# Patient Record
Sex: FEMALE | Race: WHITE | NOT HISPANIC OR LATINO | URBAN - METROPOLITAN AREA
[De-identification: names, ages, dates, MRNs, and addresses within clinical notes are randomized per-mention and may not be internally consistent; named-entity substitution may affect disease eponyms.]

---

## 2021-08-13 ENCOUNTER — EMERGENCY (EMERGENCY)
Facility: HOSPITAL | Age: 26
LOS: 1 days | Discharge: HOME | End: 2021-08-13
Attending: STUDENT IN AN ORGANIZED HEALTH CARE EDUCATION/TRAINING PROGRAM
Payer: COMMERCIAL

## 2021-08-13 VITALS
WEIGHT: 139.99 LBS | HEIGHT: 64 IN | TEMPERATURE: 98 F | SYSTOLIC BLOOD PRESSURE: 111 MMHG | DIASTOLIC BLOOD PRESSURE: 75 MMHG | HEART RATE: 77 BPM | OXYGEN SATURATION: 97 % | RESPIRATION RATE: 18 BRPM

## 2021-08-13 LAB
APPEARANCE UR: ABNORMAL
BACTERIA # UR AUTO: ABNORMAL
BILIRUB UR-MCNC: NEGATIVE — SIGNIFICANT CHANGE UP
COLOR SPEC: YELLOW — SIGNIFICANT CHANGE UP
COMMENT - URINE: SIGNIFICANT CHANGE UP
DIFF PNL FLD: ABNORMAL
EPI CELLS # UR: SIGNIFICANT CHANGE UP
GLUCOSE UR QL: NEGATIVE — SIGNIFICANT CHANGE UP
HCG UR QL: NEGATIVE — SIGNIFICANT CHANGE UP
KETONES UR-MCNC: ABNORMAL
LEUKOCYTE ESTERASE UR-ACNC: ABNORMAL
NITRITE UR-MCNC: POSITIVE
PH UR: 6 — SIGNIFICANT CHANGE UP (ref 5–8)
PROT UR-MCNC: 500 MG/DL
RBC CASTS # UR COMP ASSIST: ABNORMAL /HPF (ref 0–4)
SP GR SPEC: 1.02 — SIGNIFICANT CHANGE UP (ref 1.01–1.02)
UROBILINOGEN FLD QL: NEGATIVE — SIGNIFICANT CHANGE UP
WBC UR QL: >50

## 2021-08-13 PROCEDURE — 87186 SC STD MICRODIL/AGAR DIL: CPT

## 2021-08-13 PROCEDURE — 81025 URINE PREGNANCY TEST: CPT

## 2021-08-13 PROCEDURE — 87077 CULTURE AEROBIC IDENTIFY: CPT

## 2021-08-13 PROCEDURE — 99283 EMERGENCY DEPT VISIT LOW MDM: CPT

## 2021-08-13 PROCEDURE — 87086 URINE CULTURE/COLONY COUNT: CPT

## 2021-08-13 PROCEDURE — 99284 EMERGENCY DEPT VISIT MOD MDM: CPT

## 2021-08-13 PROCEDURE — 81001 URINALYSIS AUTO W/SCOPE: CPT

## 2021-08-13 RX ORDER — CEFPODOXIME PROXETIL 100 MG
1 TABLET ORAL
Qty: 20 | Refills: 0
Start: 2021-08-13 | End: 2021-08-22

## 2021-08-13 RX ORDER — ACETAMINOPHEN 500 MG
650 TABLET ORAL ONCE
Refills: 0 | Status: COMPLETED | OUTPATIENT
Start: 2021-08-13 | End: 2021-08-13

## 2021-08-13 RX ORDER — CEFPODOXIME PROXETIL 100 MG
200 TABLET ORAL ONCE
Refills: 0 | Status: COMPLETED | OUTPATIENT
Start: 2021-08-13 | End: 2021-08-13

## 2021-08-13 RX ORDER — PHENAZOPYRIDINE HCL 100 MG
200 TABLET ORAL ONCE
Refills: 0 | Status: COMPLETED | OUTPATIENT
Start: 2021-08-13 | End: 2021-08-13

## 2021-08-13 RX ORDER — ONDANSETRON 8 MG/1
1 TABLET, FILM COATED ORAL
Qty: 6 | Refills: 0
Start: 2021-08-13 | End: 2021-08-14

## 2021-08-13 RX ORDER — PHENAZOPYRIDINE HCL 100 MG
1 TABLET ORAL
Qty: 5 | Refills: 0
Start: 2021-08-13 | End: 2021-08-14

## 2021-08-13 RX ADMIN — Medication 200 MILLIGRAM(S): at 06:39

## 2021-08-13 RX ADMIN — Medication 650 MILLIGRAM(S): at 06:02

## 2021-08-13 NOTE — ED PROVIDER NOTE - PHYSICAL EXAMINATION
Vital signs noted, see flowsheet.  General: NAD, well appearing and non-toxic.  HEENT: NC/AT. MMM. Conjunctiva and sclera clear b/l.  EOMI. PERRL.  Neck: Soft and supple, full ROM without pain.  Cardiac: RRR. +S1/S2. Peripheral pulses 2+ and symmetric b/l. .  Respiratory: Speaking in full sentences, no evidence of respiratory distress. Lungs CTA b/l, no wheezes/rhonchi/rales/stridor.   Abdomen: Normoactive bowel sounds x 4 quadrants. Soft, NTND. No guarding or rebound tenderness. + Suprapubic tenderness.  Back: Spine midline and non-tender. No CVAT.  Skin: Normal color for race, no evidence of rash, ecchymosis, cyanosis or jaundice.   Neuro: Awake, alert and oriented to person/place/time/situation. Moves all extremities spontaneously and symmetrically.  No focal deficits  Psych: Normal affect

## 2021-08-13 NOTE — ED PROVIDER NOTE - ATTENDING CONTRIBUTION TO CARE
26yo female with no pmh presents with left lower back pain, suprapubic pain and dysuria for 2 days. Pt also with one episode of nbnb emesis. Pt also with subjective fevers. Pt denies ha, loc, focal neuro deficits, cp/sob/palp, cough, abd pain/d, recent travel and sick contacts.  +uti  +CVAT  well appearing clinically pyelo, abx, follow up

## 2021-08-13 NOTE — ED PROVIDER NOTE - OBJECTIVE STATEMENT
24 y/o F with no PMHx presents to ED c/o left lower back pain, suprapubic pain and dysuria since yesterday. Pt reports tonight was severe at home and she had 1 episode of nbnb vomiting. Pt felt hot and sweaty but no measured fever. No analgesic or antipyretic at home. Denies current nausea, diarrhea, vaginal bleeding or discharge, hematuria.     LMP: 8/4/21 24 y/o F with no PMHx presents to ED c/o left lower back pain, suprapubic pain and dysuria since yesterday. Pt reports tonight was severe at home and she had 1 episode of nbnb vomiting. Pt felt hot and sweaty but no measured fever. No analgesic or antipyretic at home. Improved after drinking cranberry juice. Denies current nausea, diarrhea, vaginal bleeding or discharge, hematuria.     LMP: 8/4/21

## 2021-08-13 NOTE — ED PROVIDER NOTE - PROGRESS NOTE DETAILS
MICHAEL Antoine NOTE: Will tx for pyelonephritis due to back discomfort; abx and strict return precautions  Pt stable for d/c, reports improvement, VSS, tolerating PO, ambulatory.  Discussion includes results, plan, proper medication use/side effects, and return precautions. Pt advised to f/u with PMD 1-2 days.  Printed copies of available lab/radiology results contained within discharge packet. Pt verbalized understanding/agreement of plan.

## 2021-08-13 NOTE — ED PROVIDER NOTE - CLINICAL SUMMARY MEDICAL DECISION MAKING FREE TEXT BOX
24 y/o F with no PMHx presents to ED c/o left lower back pain, suprapubic pain and dysuria since yesterday. Pt reports tonight was severe at home and she had 1 episode of nbnb vomiting. Pt felt hot and sweaty but no measured fever. No analgesic or antipyretic at home  -Will give tylenol, check urine and reassess

## 2021-08-13 NOTE — ED PROVIDER NOTE - NSFOLLOWUPINSTRUCTIONS_ED_ALL_ED_FT
- Please follow up with your Primary Care Doctor in 1 - 2 days. If you cannot follow-up with your primary care doctor please return to the Emergency Department for any urgent issues.  - Seek immediate medical care for any new, worsening or concerning signs or symptoms.   - Take medications as directed, be sure to read all instructions on packaging, Be sure to complete entire course of antibiotics as directed   - You were given copies of all your test results, please bring to your primary care doctor for review of any abnormal test results  - If you have difficulty following up, please call: [phone] or go to [email or web address] to obtain a [facility] doctor or specialist who takes your insurance in your area.    Feel better!      Pyelonephritis, Adult       Pyelonephritis is an infection that occurs in the kidney. The kidneys are organs that help clean the blood by moving waste out of the blood and into the pee (urine). This infection can happen quickly, or it can last for a long time. In most cases, it clears up with treatment and does not cause other problems.      What are the causes?  This condition may be caused by:  •Germs (bacteria) going from the bladder up to the kidney. This may happen after having a bladder infection.      •Germs going from the blood to the kidney.        What increases the risk?  This condition is more likely to develop in:  •Pregnant women.       •Older people.     •People who have any of these conditions:   •Diabetes.      •Inflammation of the prostate gland (prostatitis), in males.      •Kidney stones or bladder stones.       •Other problems with the kidney or the parts of your body that carry pee from the kidneys to the bladder (ureters).      •Cancer.         •People who have a small, thin tube (catheter) placed in the bladder.      •People who are sexually active.       •Women who use a medicine that kills sperm (spermicide) to prevent pregnancy.      •People who have had a prior urinary tract infection (UTI).        What are the signs or symptoms?  Symptoms of this condition include:  •Peeing often.      •A strong urge to pee right away.      •Burning or stinging when peeing.      •Belly pain.      •Back pain.       •Pain in the side (flank area).      •Fever or chills.       •Blood in the pee, or dark pee.      •Feeling sick to your stomach (nauseous) or throwing up (vomiting).        How is this treated?  This condition may be treated by:  •Taking antibiotic medicines by mouth (orally).      •Drinking enough fluids.      If the infection is bad, you may need to stay in the hospital. You may be given antibiotics and fluids that are put directly into a vein through an IV tube.    In some cases, other treatments may be needed.      Follow these instructions at home:    Medicines     •Take your antibiotic medicine as told by your doctor. Do not stop taking the antibiotic even if you start to feel better.      •Take over-the-counter and prescription medicines only as told by your doctor.        General instructions      •Drink enough fluid to keep your pee pale yellow.      •Avoid caffeine, tea, and carbonated drinks.      •Pee (urinate) often. Avoid holding in pee for long periods of time.      •Pee before and after sex.      •After pooping (having a bowel movement), women should wipe from front to back. Use each tissue only once.      •Keep all follow-up visits as told by your doctor. This is important.        Contact a doctor if:    •You do not feel better after 2 days.      •Your symptoms get worse.      •You have a fever.        Get help right away if:    •You cannot take your medicine or drink fluids as told.      •You have chills and shaking.      •You throw up.      •You have very bad pain in your side or back.      •You feel very weak or you pass out (faint).        Summary    •Pyelonephritis is an infection that occurs in the kidney.      •In most cases, this infection clears up with treatment and does not cause other problems.      •Take your antibiotic medicine as told by your doctor. Do not stop taking the antibiotic even if you start to feel better.      •Drink enough fluid to keep your pee pale yellow.      This information is not intended to replace advice given to you by your health care provider. Make sure you discuss any questions you have with your health care provider.

## 2021-08-13 NOTE — ED PROVIDER NOTE - PATIENT PORTAL LINK FT
You can access the FollowMyHealth Patient Portal offered by [facility] by registering at the following website: [email or web address]. By joining [facility]’s FollowMyHealth portal, you will also be able to view your health information using other applications (apps) compatible with our system.

## 2022-02-22 ENCOUNTER — TRANSCRIPTION ENCOUNTER (OUTPATIENT)
Age: 27
End: 2022-02-22

## 2024-04-15 PROBLEM — Z78.9 OTHER SPECIFIED HEALTH STATUS: Chronic | Status: ACTIVE | Noted: 2021-08-13

## 2024-05-09 ENCOUNTER — APPOINTMENT (OUTPATIENT)
Dept: OBGYN | Facility: CLINIC | Age: 29
End: 2024-05-09
Payer: MEDICAID

## 2024-05-09 VITALS
BODY MASS INDEX: 24.92 KG/M2 | DIASTOLIC BLOOD PRESSURE: 70 MMHG | SYSTOLIC BLOOD PRESSURE: 108 MMHG | HEIGHT: 64 IN | WEIGHT: 146 LBS

## 2024-05-09 DIAGNOSIS — Z78.9 OTHER SPECIFIED HEALTH STATUS: ICD-10-CM

## 2024-05-09 DIAGNOSIS — N89.8 OTHER SPECIFIED NONINFLAMMATORY DISORDERS OF VAGINA: ICD-10-CM

## 2024-05-09 DIAGNOSIS — Z11.3 ENCOUNTER FOR SCREENING FOR INFECTIONS WITH A PREDOMINANTLY SEXUAL MODE OF TRANSMISSION: ICD-10-CM

## 2024-05-09 DIAGNOSIS — B00.9 HERPESVIRAL INFECTION, UNSPECIFIED: ICD-10-CM

## 2024-05-09 DIAGNOSIS — Z12.4 ENCOUNTER FOR SCREENING FOR MALIGNANT NEOPLASM OF CERVIX: ICD-10-CM

## 2024-05-09 DIAGNOSIS — Z86.19 PERSONAL HISTORY OF OTHER INFECTIOUS AND PARASITIC DISEASES: ICD-10-CM

## 2024-05-09 DIAGNOSIS — A60.04 HERPESVIRAL VULVOVAGINITIS: ICD-10-CM

## 2024-05-09 PROBLEM — Z00.00 ENCOUNTER FOR PREVENTIVE HEALTH EXAMINATION: Status: ACTIVE | Noted: 2024-05-09

## 2024-05-09 PROCEDURE — 99459 PELVIC EXAMINATION: CPT

## 2024-05-09 PROCEDURE — 99203 OFFICE O/P NEW LOW 30 MIN: CPT

## 2024-05-09 RX ORDER — VALACYCLOVIR 500 MG/1
500 TABLET, FILM COATED ORAL
Qty: 10 | Refills: 0 | Status: ACTIVE | COMMUNITY
Start: 2024-05-09 | End: 1900-01-01

## 2024-05-09 RX ORDER — CLOTRIMAZOLE 10 MG/G
1 CREAM TOPICAL 3 TIMES DAILY
Qty: 1 | Refills: 3 | Status: ACTIVE | COMMUNITY
Start: 2024-05-09 | End: 1900-01-01

## 2024-05-10 PROBLEM — Z78.9 EXERCISES THREE TIMES PER WEEK: Status: ACTIVE | Noted: 2024-05-09

## 2024-05-10 PROBLEM — Z78.9 SOCIAL ALCOHOL USE: Status: ACTIVE | Noted: 2024-05-09

## 2024-05-10 PROBLEM — Z86.19 HISTORY OF HERPES SIMPLEX INFECTION: Status: RESOLVED | Noted: 2024-05-09 | Resolved: 2024-05-10

## 2024-05-10 PROBLEM — A60.04 HERPES SIMPLEX VULVOVAGINITIS: Status: ACTIVE | Noted: 2024-05-10

## 2024-05-10 PROBLEM — Z78.9 NON-SMOKER: Status: ACTIVE | Noted: 2024-05-09

## 2024-05-10 NOTE — PHYSICAL EXAM
[Chaperone Present] : A chaperone was present in the examining room during all aspects of the physical examination [11614] : A chaperone was present during the pelvic exam. [Appropriately responsive] : appropriately responsive [Alert] : alert [No Acute Distress] : no acute distress [Soft] : soft [No Lesions] : no lesions [Normal] : normal [Discharge] : a  ~M vaginal discharge was present [Moderate] : moderate [White] : white [Thick] : thick [FreeTextEntry2] : <5 resolving herpetic lesions noted along left vulvar region

## 2024-05-10 NOTE — HISTORY OF PRESENT ILLNESS
[Condoms] : uses condoms [Y] : Positive pregnancy history [Regular Cycle Intervals] : periods have been regular [Frequency: Q ___ days] : menstrual periods occur approximately every [unfilled] days [Menarche Age: ____] : age at menarche was [unfilled] [FreeTextEntry1] : 28 y.o  (24) presenting for urgent care follow up  Patient seen at urgent care ~ 1 month ago after experiences pruritic lesions along her vulvar region. States that pelvic exam was performed and was told that she had genital HSV. Various labs and cultures were obtained- which confirmed diagnosis. Was not started on any medications, and instructed to follow up with outside obgyn. Reports improved discomfort, but continues to note <5 lesions along left vulvar region. Taking OTC supplement that "Helps with herpes." Not currently sexually active-interested in male partners. Not on any form of contraception  Screening: - Pap (2024)  ObHx:  x 1 GynHx: Menses qmo. Denies hx of ovarian cysts, hx of fibroids, hx of endometriosis PMH: Denies PSH: Denies  ALL: NKDA SocHx: Lives with family. Feels safe at home. Denies depression or anxiety related symptoms. Never used tobacco products, denies illicit drug use/EtOH. FamHx: no significant family history of GYN malignancy or disease  [PGxTotal] : 1 [[facility] CenterxFulerm] : 1 [PGHxPremature] : 0 [PGHxAbortions] : 0 [[facility] Centeriving] : 1 [PGHxABInduced] : 0 [PGHxABSpont] : 0 [PGHxEctopic] : 0 [PGHxMultBirths] : 0

## 2024-05-10 NOTE — DISCUSSION/SUMMARY
[FreeTextEntry1] : 28 y.o  (24) presenting for urgent care follow up   #Genital HSV - Urgent care records reviewed: + Candidiasis, + HSV-2 serology. G/C-negative - Discussed antiviral therapy may be initiated at the first sign of symptoms (i.e., during a herpes outbreak) or used as suppressive therapy to prevent outbreaks in individuals with frequent recurrences. - Discussed vaginal hygiene/personal hygiene, including washing hands thoroughly after touching the affected area to prevent spreading the virus to other parts of the body or to others. - Discussed open disclosure with future partners. Discussed the importance of open communication, mutual trust, and shared responsibility for preventing transmission and managing the condition within the context of the relationship - Patient would like to start tx. valtrex 500mg BID x 5 d sent to pharmacy for episodic treatment - Clotrimazole x7d sent for management of candidiasis infection - RTO prn

## 2025-05-14 DIAGNOSIS — Z11.3 ENCOUNTER FOR SCREENING FOR INFECTIONS WITH A PREDOMINANTLY SEXUAL MODE OF TRANSMISSION: ICD-10-CM

## 2025-05-14 DIAGNOSIS — Z01.419 ENCOUNTER FOR GYNECOLOGICAL EXAMINATION (GENERAL) (ROUTINE) W/OUT ABNORMAL FINDINGS: ICD-10-CM

## 2025-06-05 ENCOUNTER — NON-APPOINTMENT (OUTPATIENT)
Age: 30
End: 2025-06-05

## 2025-06-05 ENCOUNTER — APPOINTMENT (OUTPATIENT)
Dept: OBGYN | Facility: CLINIC | Age: 30
End: 2025-06-05

## 2025-06-05 VITALS
SYSTOLIC BLOOD PRESSURE: 110 MMHG | BODY MASS INDEX: 22.2 KG/M2 | WEIGHT: 130 LBS | HEIGHT: 64 IN | DIASTOLIC BLOOD PRESSURE: 70 MMHG

## 2025-06-05 PROCEDURE — 99395 PREV VISIT EST AGE 18-39: CPT

## 2025-06-05 PROCEDURE — 99459 PELVIC EXAMINATION: CPT

## 2025-06-05 RX ORDER — VALACYCLOVIR 500 MG/1
500 TABLET, FILM COATED ORAL DAILY
Qty: 90 | Refills: 3 | Status: ACTIVE | COMMUNITY
Start: 2025-06-05 | End: 1900-01-01

## 2025-06-06 LAB
C TRACH RRNA SPEC QL NAA+PROBE: NOT DETECTED
N GONORRHOEA RRNA SPEC QL NAA+PROBE: NOT DETECTED
SOURCE TP AMPLIFICATION: NORMAL

## 2025-06-10 LAB — CYTOLOGY CVX/VAG DOC THIN PREP: ABNORMAL
